# Patient Record
Sex: FEMALE | Race: WHITE | NOT HISPANIC OR LATINO | Employment: OTHER | URBAN - METROPOLITAN AREA
[De-identification: names, ages, dates, MRNs, and addresses within clinical notes are randomized per-mention and may not be internally consistent; named-entity substitution may affect disease eponyms.]

---

## 2017-05-05 ENCOUNTER — AMBULATORY - HEALTHEAST (OUTPATIENT)
Dept: LAB | Facility: CLINIC | Age: 37
End: 2017-05-05

## 2017-05-05 DIAGNOSIS — E03.9 HYPOTHYROID: ICD-10-CM

## 2018-07-24 ENCOUNTER — TRANSFERRED RECORDS (OUTPATIENT)
Dept: HEALTH INFORMATION MANAGEMENT | Facility: CLINIC | Age: 38
End: 2018-07-24

## 2021-02-05 ENCOUNTER — RECORDS - HEALTHEAST (OUTPATIENT)
Dept: ADMINISTRATIVE | Facility: OTHER | Age: 41
End: 2021-02-05

## 2021-03-03 ENCOUNTER — TRANSFERRED RECORDS (OUTPATIENT)
Dept: HEALTH INFORMATION MANAGEMENT | Facility: CLINIC | Age: 41
End: 2021-03-03

## 2021-03-19 ENCOUNTER — HOSPITAL ENCOUNTER (OUTPATIENT)
Dept: RADIOLOGY | Facility: CLINIC | Age: 41
Discharge: HOME | End: 2021-03-19
Admitting: RADIOLOGY

## 2021-03-19 DIAGNOSIS — N80.9 ENDOMETRIOSIS: ICD-10-CM

## 2021-03-19 DIAGNOSIS — N92.6 ABNORMAL MENSTRUAL CYCLE: ICD-10-CM

## 2021-04-28 ENCOUNTER — RECORDS - HEALTHEAST (OUTPATIENT)
Dept: ADMINISTRATIVE | Facility: OTHER | Age: 41
End: 2021-04-28

## 2021-04-28 ENCOUNTER — AMBULATORY - HEALTHEAST (OUTPATIENT)
Dept: SURGERY | Facility: HOSPITAL | Age: 41
End: 2021-04-28

## 2021-04-28 DIAGNOSIS — Z11.59 ENCOUNTER FOR SCREENING FOR OTHER VIRAL DISEASES: ICD-10-CM

## 2021-06-26 DIAGNOSIS — Z11.59 ENCOUNTER FOR SCREENING FOR OTHER VIRAL DISEASES: ICD-10-CM

## 2021-07-12 DIAGNOSIS — Z11.59 ENCOUNTER FOR SCREENING FOR OTHER VIRAL DISEASES: ICD-10-CM

## 2021-07-12 PROCEDURE — U0005 INFEC AGEN DETEC AMPLI PROBE: HCPCS

## 2021-07-12 PROCEDURE — U0003 INFECTIOUS AGENT DETECTION BY NUCLEIC ACID (DNA OR RNA); SEVERE ACUTE RESPIRATORY SYNDROME CORONAVIRUS 2 (SARS-COV-2) (CORONAVIRUS DISEASE [COVID-19]), AMPLIFIED PROBE TECHNIQUE, MAKING USE OF HIGH THROUGHPUT TECHNOLOGIES AS DESCRIBED BY CMS-2020-01-R: HCPCS

## 2021-07-13 LAB — SARS-COV-2 RNA RESP QL NAA+PROBE: NEGATIVE

## 2021-07-15 ENCOUNTER — ANESTHESIA EVENT (OUTPATIENT)
Dept: SURGERY | Facility: HOSPITAL | Age: 41
End: 2021-07-15
Payer: COMMERCIAL

## 2021-07-16 ENCOUNTER — ANESTHESIA (OUTPATIENT)
Dept: SURGERY | Facility: HOSPITAL | Age: 41
End: 2021-07-16
Payer: COMMERCIAL

## 2021-07-16 ENCOUNTER — HOSPITAL ENCOUNTER (OUTPATIENT)
Facility: HOSPITAL | Age: 41
Discharge: HOME | End: 2021-07-16
Attending: OBSTETRICS & GYNECOLOGY | Admitting: OBSTETRICS & GYNECOLOGY
Payer: COMMERCIAL

## 2021-07-16 VITALS
WEIGHT: 138.7 LBS | RESPIRATION RATE: 15 BRPM | HEART RATE: 55 BPM | OXYGEN SATURATION: 99 % | TEMPERATURE: 97.1 F | DIASTOLIC BLOOD PRESSURE: 63 MMHG | SYSTOLIC BLOOD PRESSURE: 105 MMHG

## 2021-07-16 DIAGNOSIS — G89.18 POSTOPERATIVE PAIN: Primary | ICD-10-CM

## 2021-07-16 LAB
BASOPHILS # BLD AUTO: 0 10E3/UL (ref 0–0.2)
BASOPHILS NFR BLD AUTO: 0 %
EOSINOPHIL # BLD AUTO: 0.1 10E3/UL (ref 0–0.7)
EOSINOPHIL NFR BLD AUTO: 1 %
ERYTHROCYTE [DISTWIDTH] IN BLOOD BY AUTOMATED COUNT: 11.6 % (ref 10–15)
HCG UR QL: NEGATIVE
HCT VFR BLD AUTO: 37.8 % (ref 35–47)
HGB BLD-MCNC: 12.5 G/DL (ref 11.7–15.7)
IMM GRANULOCYTES # BLD: 0 10E3/UL
IMM GRANULOCYTES NFR BLD: 0 %
LYMPHOCYTES # BLD AUTO: 1 10E3/UL (ref 0.8–5.3)
LYMPHOCYTES NFR BLD AUTO: 20 %
MCH RBC QN AUTO: 28.7 PG (ref 26.5–33)
MCHC RBC AUTO-ENTMCNC: 33.1 G/DL (ref 31.5–36.5)
MCV RBC AUTO: 87 FL (ref 78–100)
MONOCYTES # BLD AUTO: 0.7 10E3/UL (ref 0–1.3)
MONOCYTES NFR BLD AUTO: 15 %
NEUTROPHILS # BLD AUTO: 3.1 10E3/UL (ref 1.6–8.3)
NEUTROPHILS NFR BLD AUTO: 64 %
NRBC # BLD AUTO: 0 10E3/UL
NRBC BLD AUTO-RTO: 0 /100
PLATELET # BLD AUTO: 218 10E3/UL (ref 150–450)
RBC # BLD AUTO: 4.35 10E6/UL (ref 3.8–5.2)
WBC # BLD AUTO: 4.8 10E3/UL (ref 4–11)

## 2021-07-16 PROCEDURE — 36415 COLL VENOUS BLD VENIPUNCTURE: CPT | Performed by: OBSTETRICS & GYNECOLOGY

## 2021-07-16 PROCEDURE — 81025 URINE PREGNANCY TEST: CPT | Performed by: OBSTETRICS & GYNECOLOGY

## 2021-07-16 PROCEDURE — C1765 ADHESION BARRIER: HCPCS | Performed by: OBSTETRICS & GYNECOLOGY

## 2021-07-16 PROCEDURE — 250N000011 HC RX IP 250 OP 636: Performed by: ANESTHESIOLOGY

## 2021-07-16 PROCEDURE — 272N000001 HC OR GENERAL SUPPLY STERILE: Performed by: OBSTETRICS & GYNECOLOGY

## 2021-07-16 PROCEDURE — 85004 AUTOMATED DIFF WBC COUNT: CPT | Performed by: OBSTETRICS & GYNECOLOGY

## 2021-07-16 PROCEDURE — 710N000009 HC RECOVERY PHASE 1, LEVEL 1, PER MIN: Performed by: OBSTETRICS & GYNECOLOGY

## 2021-07-16 PROCEDURE — 250N000009 HC RX 250: Performed by: NURSE ANESTHETIST, CERTIFIED REGISTERED

## 2021-07-16 PROCEDURE — 250N000011 HC RX IP 250 OP 636: Performed by: OBSTETRICS & GYNECOLOGY

## 2021-07-16 PROCEDURE — 370N000017 HC ANESTHESIA TECHNICAL FEE, PER MIN: Performed by: OBSTETRICS & GYNECOLOGY

## 2021-07-16 PROCEDURE — 710N000012 HC RECOVERY PHASE 2, PER MINUTE: Performed by: OBSTETRICS & GYNECOLOGY

## 2021-07-16 PROCEDURE — 360N000076 HC SURGERY LEVEL 3, PER MIN: Performed by: OBSTETRICS & GYNECOLOGY

## 2021-07-16 PROCEDURE — 258N000003 HC RX IP 258 OP 636: Performed by: ANESTHESIOLOGY

## 2021-07-16 PROCEDURE — 999N000141 HC STATISTIC PRE-PROCEDURE NURSING ASSESSMENT: Performed by: OBSTETRICS & GYNECOLOGY

## 2021-07-16 PROCEDURE — 258N000001 HC RX 258: Performed by: OBSTETRICS & GYNECOLOGY

## 2021-07-16 PROCEDURE — 250N000011 HC RX IP 250 OP 636: Performed by: NURSE ANESTHETIST, CERTIFIED REGISTERED

## 2021-07-16 PROCEDURE — 250N000013 HC RX MED GY IP 250 OP 250 PS 637: Performed by: OBSTETRICS & GYNECOLOGY

## 2021-07-16 RX ORDER — NALOXONE HYDROCHLORIDE 0.4 MG/ML
0.2 INJECTION, SOLUTION INTRAMUSCULAR; INTRAVENOUS; SUBCUTANEOUS
Status: DISCONTINUED | OUTPATIENT
Start: 2021-07-16 | End: 2021-07-16 | Stop reason: HOSPADM

## 2021-07-16 RX ORDER — SODIUM CHLORIDE, SODIUM LACTATE, POTASSIUM CHLORIDE, CALCIUM CHLORIDE 600; 310; 30; 20 MG/100ML; MG/100ML; MG/100ML; MG/100ML
INJECTION, SOLUTION INTRAVENOUS CONTINUOUS
Status: DISCONTINUED | OUTPATIENT
Start: 2021-07-16 | End: 2021-07-16 | Stop reason: HOSPADM

## 2021-07-16 RX ORDER — FENTANYL CITRATE 50 UG/ML
50 INJECTION, SOLUTION INTRAMUSCULAR; INTRAVENOUS EVERY 5 MIN PRN
Status: DISCONTINUED | OUTPATIENT
Start: 2021-07-16 | End: 2021-07-16 | Stop reason: HOSPADM

## 2021-07-16 RX ORDER — NALOXONE HYDROCHLORIDE 0.4 MG/ML
0.4 INJECTION, SOLUTION INTRAMUSCULAR; INTRAVENOUS; SUBCUTANEOUS
Status: DISCONTINUED | OUTPATIENT
Start: 2021-07-16 | End: 2021-07-16 | Stop reason: HOSPADM

## 2021-07-16 RX ORDER — ONDANSETRON 4 MG/1
4 TABLET, ORALLY DISINTEGRATING ORAL EVERY 30 MIN PRN
Status: DISCONTINUED | OUTPATIENT
Start: 2021-07-16 | End: 2021-07-16 | Stop reason: HOSPADM

## 2021-07-16 RX ORDER — SODIUM CHLORIDE, SODIUM LACTATE, POTASSIUM CHLORIDE, AND CALCIUM CHLORIDE .6; .31; .03; .02 G/100ML; G/100ML; G/100ML; G/100ML
IRRIGANT IRRIGATION PRN
Status: DISCONTINUED | OUTPATIENT
Start: 2021-07-16 | End: 2021-07-16 | Stop reason: HOSPADM

## 2021-07-16 RX ORDER — LIDOCAINE 40 MG/G
CREAM TOPICAL
Status: DISCONTINUED | OUTPATIENT
Start: 2021-07-16 | End: 2021-07-16 | Stop reason: HOSPADM

## 2021-07-16 RX ORDER — ACETAMINOPHEN 325 MG/1
325-650 TABLET ORAL EVERY 4 HOURS PRN
Status: DISCONTINUED | OUTPATIENT
Start: 2021-07-16 | End: 2021-07-16 | Stop reason: HOSPADM

## 2021-07-16 RX ORDER — MEPERIDINE HYDROCHLORIDE 25 MG/ML
12.5 INJECTION INTRAMUSCULAR; INTRAVENOUS; SUBCUTANEOUS
Status: DISCONTINUED | OUTPATIENT
Start: 2021-07-16 | End: 2021-07-16 | Stop reason: HOSPADM

## 2021-07-16 RX ORDER — KETOROLAC TROMETHAMINE 30 MG/ML
INJECTION, SOLUTION INTRAMUSCULAR; INTRAVENOUS PRN
Status: DISCONTINUED | OUTPATIENT
Start: 2021-07-16 | End: 2021-07-16

## 2021-07-16 RX ORDER — GLYCOPYRROLATE 0.2 MG/ML
INJECTION, SOLUTION INTRAMUSCULAR; INTRAVENOUS PRN
Status: DISCONTINUED | OUTPATIENT
Start: 2021-07-16 | End: 2021-07-16

## 2021-07-16 RX ORDER — OXYCODONE HYDROCHLORIDE 5 MG/1
TABLET ORAL
Qty: 12 TABLET | Refills: 0 | Status: SHIPPED | OUTPATIENT
Start: 2021-07-16 | End: 2023-09-18

## 2021-07-16 RX ORDER — HYDROMORPHONE HCL IN WATER/PF 6 MG/30 ML
0.4 PATIENT CONTROLLED ANALGESIA SYRINGE INTRAVENOUS EVERY 5 MIN PRN
Status: DISCONTINUED | OUTPATIENT
Start: 2021-07-16 | End: 2021-07-16 | Stop reason: HOSPADM

## 2021-07-16 RX ORDER — LIDOCAINE HYDROCHLORIDE 20 MG/ML
INJECTION, SOLUTION INFILTRATION; PERINEURAL PRN
Status: DISCONTINUED | OUTPATIENT
Start: 2021-07-16 | End: 2021-07-16

## 2021-07-16 RX ORDER — ACETAMINOPHEN 325 MG/1
975 TABLET ORAL ONCE
Status: COMPLETED | OUTPATIENT
Start: 2021-07-16 | End: 2021-07-16

## 2021-07-16 RX ORDER — ONDANSETRON 2 MG/ML
INJECTION INTRAMUSCULAR; INTRAVENOUS PRN
Status: DISCONTINUED | OUTPATIENT
Start: 2021-07-16 | End: 2021-07-16

## 2021-07-16 RX ORDER — ONDANSETRON 2 MG/ML
4 INJECTION INTRAMUSCULAR; INTRAVENOUS EVERY 30 MIN PRN
Status: DISCONTINUED | OUTPATIENT
Start: 2021-07-16 | End: 2021-07-16 | Stop reason: HOSPADM

## 2021-07-16 RX ORDER — PROPOFOL 10 MG/ML
INJECTION, EMULSION INTRAVENOUS PRN
Status: DISCONTINUED | OUTPATIENT
Start: 2021-07-16 | End: 2021-07-16

## 2021-07-16 RX ORDER — OXYCODONE HYDROCHLORIDE 5 MG/1
5-10 TABLET ORAL EVERY 4 HOURS PRN
Status: DISCONTINUED | OUTPATIENT
Start: 2021-07-16 | End: 2021-07-16 | Stop reason: HOSPADM

## 2021-07-16 RX ORDER — IBUPROFEN 200 MG
600 TABLET ORAL EVERY 4 HOURS PRN
Status: DISCONTINUED | OUTPATIENT
Start: 2021-07-16 | End: 2021-07-16 | Stop reason: HOSPADM

## 2021-07-16 RX ORDER — FENTANYL CITRATE 50 UG/ML
INJECTION, SOLUTION INTRAMUSCULAR; INTRAVENOUS PRN
Status: DISCONTINUED | OUTPATIENT
Start: 2021-07-16 | End: 2021-07-16

## 2021-07-16 RX ORDER — NEOSTIGMINE METHYLSULFATE 1 MG/ML
VIAL (ML) INJECTION PRN
Status: DISCONTINUED | OUTPATIENT
Start: 2021-07-16 | End: 2021-07-16

## 2021-07-16 RX ORDER — DEXAMETHASONE SODIUM PHOSPHATE 10 MG/ML
INJECTION, SOLUTION INTRAMUSCULAR; INTRAVENOUS PRN
Status: DISCONTINUED | OUTPATIENT
Start: 2021-07-16 | End: 2021-07-16

## 2021-07-16 RX ADMIN — ROCURONIUM BROMIDE 15 MG: 10 INJECTION, SOLUTION INTRAVENOUS at 13:47

## 2021-07-16 RX ADMIN — NEOSTIGMINE METHYLSULFATE 3 MG: 1 INJECTION INTRAVENOUS at 14:33

## 2021-07-16 RX ADMIN — Medication 25 MG: at 13:50

## 2021-07-16 RX ADMIN — LIDOCAINE HYDROCHLORIDE 60 MG: 20 INJECTION, SOLUTION INFILTRATION; PERINEURAL at 13:20

## 2021-07-16 RX ADMIN — SODIUM CHLORIDE, POTASSIUM CHLORIDE, SODIUM LACTATE AND CALCIUM CHLORIDE: 600; 310; 30; 20 INJECTION, SOLUTION INTRAVENOUS at 12:19

## 2021-07-16 RX ADMIN — FENTANYL CITRATE 50 MCG: 50 INJECTION, SOLUTION INTRAMUSCULAR; INTRAVENOUS at 15:16

## 2021-07-16 RX ADMIN — ACETAMINOPHEN 975 MG: 325 TABLET ORAL at 12:18

## 2021-07-16 RX ADMIN — PROPOFOL 200 MCG/KG/MIN: 10 INJECTION, EMULSION INTRAVENOUS at 13:22

## 2021-07-16 RX ADMIN — DEXAMETHASONE SODIUM PHOSPHATE 10 MG: 10 INJECTION, SOLUTION INTRAMUSCULAR; INTRAVENOUS at 13:29

## 2021-07-16 RX ADMIN — PROPOFOL 140 MG: 10 INJECTION, EMULSION INTRAVENOUS at 13:20

## 2021-07-16 RX ADMIN — FENTANYL CITRATE 100 MCG: 50 INJECTION, SOLUTION INTRAMUSCULAR; INTRAVENOUS at 13:20

## 2021-07-16 RX ADMIN — PROPOFOL 60 MG: 10 INJECTION, EMULSION INTRAVENOUS at 13:46

## 2021-07-16 RX ADMIN — PROPOFOL 250 MG: 10 INJECTION, EMULSION INTRAVENOUS at 13:39

## 2021-07-16 RX ADMIN — ROCURONIUM BROMIDE 35 MG: 10 INJECTION, SOLUTION INTRAVENOUS at 13:20

## 2021-07-16 RX ADMIN — KETOROLAC TROMETHAMINE 15 MG: 30 INJECTION, SOLUTION INTRAMUSCULAR; INTRAVENOUS at 14:26

## 2021-07-16 RX ADMIN — OXYCODONE HYDROCHLORIDE 5 MG: 5 TABLET ORAL at 16:29

## 2021-07-16 RX ADMIN — GLYCOPYRROLATE 0.4 MG: 0.2 INJECTION, SOLUTION INTRAMUSCULAR; INTRAVENOUS at 14:33

## 2021-07-16 RX ADMIN — MIDAZOLAM HYDROCHLORIDE 2 MG: 1 INJECTION, SOLUTION INTRAMUSCULAR; INTRAVENOUS at 13:10

## 2021-07-16 RX ADMIN — ONDANSETRON 4 MG: 2 INJECTION INTRAMUSCULAR; INTRAVENOUS at 14:26

## 2021-07-16 NOTE — ANESTHESIA CARE TRANSFER NOTE
Patient: Peace Maki    Procedure(s):  LAPAROSCOPY WITH A TUBAL DYE STUDY,FULGURATION OF  ENDOMETRIOSIS    Diagnosis: Dysmenorrhea [N94.6]  Endometriosis [N80.9]  Abnormal findings on imaging test [R93.89]  Diagnosis Additional Information: No value filed.    Anesthesia Type:   General     Note:      Level of Consciousness: awake and drowsy  Oxygen Supplementation: face mask  Level of Supplemental Oxygen (L/min / FiO2): 8  Independent Airway: airway patency satisfactory and stable  Dentition: dentition unchanged  Vital Signs Stable: post-procedure vital signs reviewed and stable  Report to RN Given: handoff report given  Patient transferred to: PACU    Handoff Report: Identifed the Patient, Identified the Reponsible Provider, Reviewed the pertinent medical history, Discussed the surgical course, Reviewed Intra-OP anesthesia mangement and issues during anesthesia, Set expectations for post-procedure period and Allowed opportunity for questions and acknowledgement of understanding      Vitals: (Last set prior to Anesthesia Care Transfer)  CRNA VITALS  7/16/2021 1421 - 7/16/2021 1457      7/16/2021             /66  HR 75  Sat 100%  RR 12         Electronically Signed By: RAMANDEEP Marroquin CRNA  July 16, 2021  2:57 PM

## 2021-07-16 NOTE — H&P
21 Peace Maki [facility] [birth year]     Chief Complaint:  Painful periods with history of endometriosis and an abnormal hysterosalpingogram.     History of the Present Illness:  Patient is a 40-year-old  4 para 2 spontaneous loss 2 female who was diagnosed with endometriosis 8 years ago.  Since that time, she had vaginal births 4 years ago and 6 years ago.  She does have pain with her menses but no pain with ovulation or pelvic pain.  She underwent an HSG which did not show any spill from either tube.  She is concerned that she may have scarring.  She would like to conceive.  She uses Advil, Tylenol and a heating pad for the pain with her menses and it does not really help much.     Past Medical History:  She denies medical problems including diabetes, high blood pressure, heart disease, rheumatoid arthritis, kidney disease, psychiatric disease, seizures, hepatitis, asthma or abnormal Pap smear.  She does take desiccated thyroid 30 mg daily.  She has no drug allergies.  She underwent laparoscopy 8 years ago with findings of endometriosis.     Personal, Family and Social History:  She has been  for 15 years and has a college degree.  She is an .  She does not smoke.  Her  also designs houses.  Her parents are in good health and she has 3 brothers and 3 sisters who are in good health.  She is from [place] and her  is from [place].     Review of Systems:  Noncontributory.     Physical Exam:  Blood pressure 115/74, temp 97.5, pulse 82, height 65 inches, weight 125 pounds, BMI 27.5.  General appearance: Well-developed, well-nourished white female who is in no acute distress and who is oriented with a normal gait.  Thyroid: Normal.  Lungs: Clear.  Heart: Normal sinus rhythm without significant murmurs.  Breasts: Not examined.  Abdomen: Soft.  No masses, organomegaly or tenderness present.  Skin: Clear.  Extremities: No cyanosis, clubbing or edema.  Pelvic exam: Deferred  today.     Assessment:  1.  Dysmenorrhea, history of pelvic endometriosis documented on laparoscopy and abnormal HSG.  Concerned about tubal scarring.     Plan:  She will be brought to hospital for laparoscopy with a bilateral tubal dye study with possible hysteroscopy.  I explained that if I could not get spill from each tube, I would treat any endometriosis or abnormalities I found and the pelvis and perform an HSG to see if I could cannulate the tubes to open them up.  I discussed the risks of the surgery including infection, bleeding, anesthesia and injury to other organs and the small chance of a bigger surgery of severe endometriosis were found.  I explained that I could make no guarantees regarding relief of her pain or regarding opening her tubes.  She was agreeable with that approach, voiced understanding and signed an appropriate consent form.  Hemoglobin today was 13.2.     Vasu Khan MD

## 2021-07-16 NOTE — ANESTHESIA PREPROCEDURE EVALUATION
Anesthesia Pre-Procedure Evaluation    Patient: Peace Maki   MRN: [id number] : 1980        Preoperative Diagnosis: Dysmenorrhea [N94.6]  Endometriosis [N80.9]  Abnormal findings on imaging test [R93.89]   Procedure : Procedure(s):  LAPAROSCOPY WITH A TUBAL DYE STUDY;  POSSIBLE HYSTEROSCOPY     Past Medical History:   Diagnosis Date     PONV (postoperative nausea and vomiting)       Past Surgical History:   Procedure Laterality Date     NO PAST SURGERIES        No Known Allergies   Social History     Tobacco Use     Smoking status: Never Smoker     Smokeless tobacco: Never Used   Substance Use Topics     Alcohol use: Not on file      Wt Readings from Last 1 Encounters:   21 62.9 kg (138 lb 11.2 oz)        Anesthesia Evaluation   Pt has had prior anesthetic. Type: General.    No history of anesthetic complications       ROS/MED HX  ENT/Pulmonary:  - neg pulmonary ROS     Neurologic:       Cardiovascular:  - neg cardiovascular ROS     METS/Exercise Tolerance:     Hematologic:  - neg hematologic  ROS     Musculoskeletal:  - neg musculoskeletal ROS     GI/Hepatic:  - neg GI/hepatic ROS     Renal/Genitourinary:  - neg Renal ROS     Endo:  - neg endo ROS     Psychiatric/Substance Use:  - neg psychiatric ROS     Infectious Disease:  - neg infectious disease ROS     Malignancy:  - neg malignancy ROS     Other:            Physical Exam    Airway        Mallampati: I   TM distance: > 3 FB   Neck ROM: full   Mouth opening: > 3 cm    Respiratory Devices and Support         Dental  no notable dental history         Cardiovascular   cardiovascular exam normal          Pulmonary   pulmonary exam normal                OUTSIDE LABS:  CBC:   Lab Results   Component Value Date    WBC 4.8 2021    HGB 12.5 2021    HCT 37.8 2021     2021     BMP: No results found for: NA, POTASSIUM, CHLORIDE, CO2, BUN, CR, GLC  COAGS: No results found for: PTT, INR, FIBR  POC:   Lab Results   Component  Value Date    HCG Negative 07/16/2021     HEPATIC: No results found for: ALBUMIN, PROTTOTAL, ALT, AST, GGT, ALKPHOS, BILITOTAL, BILIDIRECT, KRISTEN  OTHER: No results found for: PH, LACT, A1C, FARZANA, PHOS, MAG, LIPASE, AMYLASE, TSH, T4, T3, CRP, SED    Anesthesia Plan    ASA Status:  1      Anesthesia Type: General.     - Airway: ETT   Induction: Intravenous, Propofol.   Maintenance: Inhalation.        Consents         - Patient is DNR/DNI Status: No         Postoperative Care       PONV prophylaxis: Ondansetron (or other 5HT-3), Dexamethasone or Solumedrol     Comments:    Pt told me she had no issues with GEN.  Mg++ infusion, ketamine, ketorolac if ok with surgeon.              Bertram Paiz MD

## 2021-07-16 NOTE — PLAN OF CARE
Discharge Instructions after Laparoscopy    You have three small abdominal incisions from your laparoscopy. Because of the incisions and the surgery done on the inside, you will be achy, sore and tired. Since the incisions are small, your activity level is based on your comfort.    You will be discharged with a prescription for Oxycodone for pain. You may combine the Oxycodone with Ibuprofen and/or Tylenol to increase its effectiveness. Please take the  Oxycodone if needed. If you do not need it, do not take it.    Please watch for infection by taking your temperature every evening the first week at home and call me if it goes above 100 degrees. If your incisions become red, swollen, painful or drain pus, please call.    You may removed the dressings from your wounds in three days. The stitches beneath the skin will eventually dissolve and the part above the skin will fall out.Bathing or showering is fine.    Please call the office at [phone] for a post operative appointment in two weeks.     For after-hours emergencies, you can reach me at [phone].        Sergio Khan MD

## 2021-07-16 NOTE — OP NOTE
21 Peace Maki  [birth year]    Operative note    Preoperative diagnosis: #1.  Dysmenorrhea, history of pelvic endometriosis documented on laparoscopy and abnormal HSG.  Concern about tubal scarring.    Postoperative diagnosis: #1.  Same.  2.  Stage II pelvic endometriosis.  3.  Patent fallopian tubes.    Procedure: Laparoscopy with a tubal dye study and fulguration of pelvic endometriosis.    Surgeon: Bill.    Anesthetic agent: General.    Specimens: None.    Findings: See the operative note.    Description of the operation: The patient was placed in the boot stirrup position with the leg slightly above horizontal after the induction of adequate general anesthesia.  The abdomen and perineum were prepped and draped for laparoscopy with a tubal dye study and possible hysteroscopy in the routine manner.  A Conn cannula was attached to the cervix and a Shane catheter draining urinary bladder.  An infraumbilical incision was made through the skin and varies needle inserted intraperitoneally.  The abdomen was insufflated to pressure 15 and a 5 mm disposable trocar inserted with 1 thrust without difficulty.  Insertion was confirmed by visualization through the laparoscope on the video screen.  Ultimately, a 5 mm port was placed suprapubically and a third 5 mm port was placed in the left lower quadrant.  The patient was placed in Trendelenburg and the pelvis visualized.  The uterus appeared normal.  There were some endometriosis implants on the posterior aspect of the uterus near the cervix.  The right tube appeared normal as did the right ovary.  The left tube appeared normal as did the left ovary.  Dye was injected through the con cannula with spill from the left side but not the right.  I then went below and repositioned the con cannula and again injected and there was spill from both sides.  I then went below and replaced the con cannula with the HUMI uterine manipulator.  The pelvis was then irrigated.   There were endometriosis implants in the cul-de-sac, the uterosacral ligaments where they join the uterus, the posterior aspect of the right broad ligament, the right pelvic sidewall, left pelvic sidewall, and the posterior aspect of the left broad ligament.  A few of them were deeply infiltrative making the diagnosis of stage II endometriosis.  There was a defect in the peritoneum on the left side of the broad ligament about a centimeter in size with endometriosis near it.  The appendix appeared normal liver appeared normal in color and smooth.  All the endometriosis implants were fulgurated with bipolar cautery thoroughly.  Interceed adhesion barriers were then placed around each ovary, the uterus and the cul-de-sac.  The gas was allowed to escape from the abdomen and instruments removed.  The incisions were closed with 4-0 plain sutures and 2 x 2's and Tegaderm applied.  Estimated blood loss was 15 cc.  The patient tolerated the operation well and was returned to the recovery room in good condition.  Sponge and needle counts were correct.  The Shane catheter and HUMI uterine manipulator were removed at the end of the procedure.    Vasu Khan MD

## 2021-07-16 NOTE — H&P
21 Peace Maki [facility] [birth year]    Chief Complaint:  Painful periods with history of endometriosis and an abnormal hysterosalpingogram.    History of the Present Illness:  Patient is a 40-year-old  4 para 2 spontaneous loss 2 female who was diagnosed with endometriosis 8 years ago.  Since that time, she had vaginal births 4 years ago and 6 years ago.  She does have pain with her menses but no pain with ovulation or pelvic pain.  She underwent an HSG which did not show any spill from either tube.  She is concerned that she may have scarring.  She would like to conceive.  She uses Advil, Tylenol and a heating pad for the pain with her menses and it does not really help much.    Past Medical History:  She denies medical problems including diabetes, high blood pressure, heart disease, rheumatoid arthritis, kidney disease, psychiatric disease, seizures, hepatitis, asthma or abnormal Pap smear.  She does take desiccated thyroid 30 mg daily.  She has no drug allergies.  She underwent laparoscopy 8 years ago with findings of endometriosis.    Personal, Family and Social History:  She has been  for 15 years and has a college degree.  She is an .  She does not smoke.  Her  also designs houses.  Her parents are in good health and she has 3 brothers and 3 sisters who are in good health.  She is from [place] and her  is from [place].    Review of Systems:  Noncontributory.    Physical Exam:  Blood pressure 115/74, temp 97.5, pulse 82, height 65 inches, weight 125 pounds, BMI 27.5.  General appearance: Well-developed, well-nourished white female who is in no acute distress and who is oriented with a normal gait.  Thyroid: Normal.  Lungs: Clear.  Heart: Normal sinus rhythm without significant murmurs.  Breasts: Not examined.  Abdomen: Soft.  No masses, organomegaly or tenderness present.  Skin: Clear.  Extremities: No cyanosis, clubbing or edema.  Pelvic exam: Deferred  today.    Assessment:  1.  Dysmenorrhea, history of pelvic endometriosis documented on laparoscopy and abnormal HSG.  Concerned about tubal scarring.    Plan:  She will be brought to hospital for laparoscopy with a bilateral tubal dye study with possible hysteroscopy.  I explained that if I could not get spill from each tube, I would treat any endometriosis or abnormalities I found and the pelvis and perform an HSG to see if I could cannulate the tubes to open them up.  I discussed the risks of the surgery including infection, bleeding, anesthesia and injury to other organs and the small chance of a bigger surgery of severe endometriosis were found.  I explained that I could make no guarantees regarding relief of her pain or regarding opening her tubes.  She was agreeable with that approach, voiced understanding and signed an appropriate consent form.  Hemoglobin today was 13.2.    Vasu Khan MD

## 2021-07-17 NOTE — ANESTHESIA POSTPROCEDURE EVALUATION
Patient: Peace Maki    Procedure(s):  LAPAROSCOPY WITH A TUBAL DYE STUDY,FULGURATION OF  ENDOMETRIOSIS    Diagnosis:Dysmenorrhea [N94.6]  Endometriosis [N80.9]  Abnormal findings on imaging test [R93.89]  Diagnosis Additional Information: No value filed.    Anesthesia Type:  General    Note:  Disposition: Outpatient   Postop Pain Control: Uneventful            Sign Out: Well controlled pain   PONV: No   Neuro/Psych: Uneventful            Sign Out: Acceptable/Baseline neuro status   Airway/Respiratory: Uneventful            Sign Out: Acceptable/Baseline resp. status   CV/Hemodynamics: Uneventful            Sign Out: Acceptable CV status; No obvious hypovolemia; No obvious fluid overload   Other NRE: NONE   DID A NON-ROUTINE EVENT OCCUR? No    Event details/Postop Comments:  No issues.  Teeth atraumatic.  Pain controlled.  AVSS.  No awareness.  Eyes without trauma.            Last vitals:  Vitals:    07/16/21 1515 07/16/21 1530 07/16/21 1545   BP: 110/63 98/61 105/63   Pulse: 59 51 55   Resp: 20 16 15   Temp:      SpO2: 100% 99% 99%       Last vitals prior to Anesthesia Care Transfer:  CRNA VITALS  7/16/2021 1427 - 7/16/2021 1527      7/16/2021             Resp Rate (observed):  (!) 2          Electronically Signed By: Bony Cooper MD  July 16, 2021  7:49 PM

## 2022-03-27 ENCOUNTER — HEALTH MAINTENANCE LETTER (OUTPATIENT)
Age: 42
End: 2022-03-27

## 2022-07-15 ENCOUNTER — TRANSFERRED RECORDS (OUTPATIENT)
Dept: HEALTH INFORMATION MANAGEMENT | Facility: CLINIC | Age: 42
End: 2022-07-15

## 2022-07-15 LAB
ALT SERPL-CCNC: 12 U/L (ref 6–29)
CREATININE (EXTERNAL): 0.84 MG/DL (ref 0.5–0.99)
GFR ESTIMATED (EXTERNAL): 89 ML/MIN/1.73M2
GLUCOSE (EXTERNAL): 87 MG/DL (ref 65–99)
POTASSIUM (EXTERNAL): 3.7 MMOL/L (ref 3.5–5.3)
TSH SERPL-ACNC: 1.28 MIU/L (ref 0.4–4.5)

## 2022-07-25 ENCOUNTER — TRANSFERRED RECORDS (OUTPATIENT)
Dept: HEALTH INFORMATION MANAGEMENT | Facility: CLINIC | Age: 42
End: 2022-07-25

## 2022-09-25 ENCOUNTER — HEALTH MAINTENANCE LETTER (OUTPATIENT)
Age: 42
End: 2022-09-25

## 2023-05-13 ENCOUNTER — HEALTH MAINTENANCE LETTER (OUTPATIENT)
Age: 43
End: 2023-05-13

## 2023-09-12 ENCOUNTER — TRANSFERRED RECORDS (OUTPATIENT)
Dept: HEALTH INFORMATION MANAGEMENT | Facility: CLINIC | Age: 43
End: 2023-09-12
Payer: COMMERCIAL

## 2023-09-17 ASSESSMENT — ENCOUNTER SYMPTOMS
COUGH: 0
WEAKNESS: 0
HEMATOCHEZIA: 0
HEADACHES: 0
ARTHRALGIAS: 0
HEMATURIA: 0
DIZZINESS: 0
JOINT SWELLING: 0
PALPITATIONS: 0
ABDOMINAL PAIN: 0
BREAST MASS: 0
SORE THROAT: 0
FREQUENCY: 0
DYSURIA: 0
MYALGIAS: 1
FEVER: 0
SHORTNESS OF BREATH: 0
NAUSEA: 0
CHILLS: 0
EYE PAIN: 0
HEARTBURN: 0
PARESTHESIAS: 0
CONSTIPATION: 0
NERVOUS/ANXIOUS: 0
DIARRHEA: 0

## 2023-09-18 ENCOUNTER — OFFICE VISIT (OUTPATIENT)
Dept: FAMILY MEDICINE | Facility: CLINIC | Age: 43
End: 2023-09-18
Payer: COMMERCIAL

## 2023-09-18 VITALS
SYSTOLIC BLOOD PRESSURE: 134 MMHG | HEIGHT: 65 IN | DIASTOLIC BLOOD PRESSURE: 83 MMHG | HEART RATE: 81 BPM | TEMPERATURE: 97.8 F | OXYGEN SATURATION: 99 % | WEIGHT: 144 LBS | BODY MASS INDEX: 23.99 KG/M2 | RESPIRATION RATE: 12 BRPM

## 2023-09-18 DIAGNOSIS — Z13.29 SCREENING FOR THYROID DISORDER: ICD-10-CM

## 2023-09-18 DIAGNOSIS — Z00.00 VISIT FOR PREVENTIVE HEALTH EXAMINATION: Primary | ICD-10-CM

## 2023-09-18 LAB
CHOLEST SERPL-MCNC: 201 MG/DL
FASTING STATUS PATIENT QL REPORTED: YES
GLUCOSE SERPL-MCNC: 102 MG/DL (ref 70–99)
HDLC SERPL-MCNC: 89 MG/DL
LDLC SERPL CALC-MCNC: 101 MG/DL
NONHDLC SERPL-MCNC: 112 MG/DL
TRIGL SERPL-MCNC: 54 MG/DL
TSH SERPL DL<=0.005 MIU/L-ACNC: 1.44 UIU/ML (ref 0.3–4.2)

## 2023-09-18 PROCEDURE — 84443 ASSAY THYROID STIM HORMONE: CPT | Performed by: NURSE PRACTITIONER

## 2023-09-18 PROCEDURE — 82947 ASSAY GLUCOSE BLOOD QUANT: CPT | Performed by: NURSE PRACTITIONER

## 2023-09-18 PROCEDURE — 36415 COLL VENOUS BLD VENIPUNCTURE: CPT | Performed by: NURSE PRACTITIONER

## 2023-09-18 PROCEDURE — 80061 LIPID PANEL: CPT | Performed by: NURSE PRACTITIONER

## 2023-09-18 PROCEDURE — 99386 PREV VISIT NEW AGE 40-64: CPT | Performed by: NURSE PRACTITIONER

## 2023-09-18 RX ORDER — PROGESTERONE 200 MG/1
CAPSULE ORAL
COMMUNITY
End: 2024-03-04

## 2023-09-18 RX ORDER — LETROZOLE 2.5 MG/1
TABLET, FILM COATED ORAL
COMMUNITY
Start: 2022-12-27 | End: 2024-03-04

## 2023-09-18 ASSESSMENT — ANXIETY QUESTIONNAIRES
1. FEELING NERVOUS, ANXIOUS, OR ON EDGE: NOT AT ALL
GAD7 TOTAL SCORE: 0
GAD7 TOTAL SCORE: 0
4. TROUBLE RELAXING: NOT AT ALL
7. FEELING AFRAID AS IF SOMETHING AWFUL MIGHT HAPPEN: NOT AT ALL
2. NOT BEING ABLE TO STOP OR CONTROL WORRYING: NOT AT ALL
5. BEING SO RESTLESS THAT IT IS HARD TO SIT STILL: NOT AT ALL
3. WORRYING TOO MUCH ABOUT DIFFERENT THINGS: NOT AT ALL
6. BECOMING EASILY ANNOYED OR IRRITABLE: NOT AT ALL
IF YOU CHECKED OFF ANY PROBLEMS ON THIS QUESTIONNAIRE, HOW DIFFICULT HAVE THESE PROBLEMS MADE IT FOR YOU TO DO YOUR WORK, TAKE CARE OF THINGS AT HOME, OR GET ALONG WITH OTHER PEOPLE: NOT DIFFICULT AT ALL

## 2023-09-18 ASSESSMENT — ENCOUNTER SYMPTOMS
HEMATURIA: 0
PARESTHESIAS: 0
MYALGIAS: 1
PALPITATIONS: 0
COUGH: 0
BREAST MASS: 0
DIZZINESS: 0
HEADACHES: 0
HEMATOCHEZIA: 0
FREQUENCY: 0
CHILLS: 0
EYE PAIN: 0
ARTHRALGIAS: 0
DIARRHEA: 0
ABDOMINAL PAIN: 0
HEARTBURN: 0
WEAKNESS: 0
SORE THROAT: 0
JOINT SWELLING: 0
FEVER: 0
NAUSEA: 0
SHORTNESS OF BREATH: 0
CONSTIPATION: 0
NERVOUS/ANXIOUS: 0
DYSURIA: 0

## 2023-09-18 NOTE — PROGRESS NOTES
SUBJECTIVE:   CC: Peace is an 42 year old who presents for preventive health visit.   -She feels her health is overall stable.  She stated that she has been seen at an outside OB/GYN provider and she believes her Paps are up-to-date.  She stated that her Pap smears have been negative.  Family history of breast cancer, and her mammogram was completed and it was normal.  She follows up with dermatology every year for precancerous lesions.  She has a family history of skin cancer.   -She is following up with OB/GYN also related to oral progesterone.  She stated that oral progesterone has been really helpful with preventing migraines, and PMS.  She did have her hormones checked with OB/GYN.  She stated that she was also on thyroid medication back in 2015.  Has been off the medication for couple years now.  She was diagnosed with endometriosis back in 2021.         9/18/2023     9:23 AM   Additional Questions   Roomed by Roge       Healthy Habits:     Getting at least 3 servings of Calcium per day:  Yes    Bi-annual eye exam:  Yes    Dental care twice a year:  Yes    Sleep apnea or symptoms of sleep apnea:  None    Diet:  Regular (no restrictions)    Frequency of exercise:  2-3 days/week    Duration of exercise:  15-30 minutes    Taking medications regularly:  Yes    Medication side effects:  None    Additional concerns today:  Yes      Today's PHQ-2 Score:       9/17/2023     9:38 PM   PHQ-2 ( 1999 [employer])   Q1: Little interest or pleasure in doing things 0   Q2: Feeling down, depressed or hopeless 0   PHQ-2 Score 0   Q1: Little interest or pleasure in doing things Not at all   Q2: Feeling down, depressed or hopeless Not at all   PHQ-2 Score 0     Have you ever done Advance Care Planning? (For example, a Health Directive, POLST, or a discussion with a medical provider or your loved ones about your wishes): No, advance care planning information given to patient to review.  Advanced care planning was discussed at  today's visit.    Social History     Tobacco Use    Smoking status: Never    Smokeless tobacco: Never   Substance Use Topics    Alcohol use: Never             9/17/2023     9:38 PM   Alcohol Use   Prescreen: >3 drinks/day or >7 drinks/week? No          No data to display              Reviewed orders with patient.  Reviewed health maintenance and updated orders accordingly - Yes  Lab work is in process  Labs reviewed in EPIC    Breast Cancer Screening:    FHS-7:       9/17/2023     9:40 PM   Breast CA Risk Assessment (FHS-7)   Did any of your first-degree relatives have breast or ovarian cancer? Yes   Did any of your relatives have bilateral breast cancer? No   Did any man in your family have breast cancer? No   Did any woman in your family have breast and ovarian cancer? No   Did any woman in your family have breast cancer before age 50 y? No   Do you have 2 or more relatives with breast and/or ovarian cancer? Yes   Do you have 2 or more relatives with breast and/or bowel cancer? No     click delete button to remove this line now  Mammogram Screening - Offered annual screening and updated Health Maintenance for mutual plan based on risk factor consideration  Pertinent mammograms are reviewed under the imaging tab.    History of abnormal Pap smear: NO - age 30-65 PAP every 5 years with negative HPV co-testing recommended     Reviewed and updated as needed this visit by clinical staff   Tobacco  Allergies  Meds              Reviewed and updated as needed this visit by Provider                 Past Medical History:   Diagnosis Date    PONV (postoperative nausea and vomiting)       Past Surgical History:   Procedure Laterality Date    LAPAROSCOPIC TUBAL DYE STUDY Bilateral 7/16/2021    Procedure: LAPAROSCOPY WITH A TUBAL DYE STUDY, FULGURATION OF  ENDOMETRIOSIS;  Surgeon: Vasu Khan MD;  Location: [facility] [place] OR    NO PAST SURGERIES         Review of Systems   Constitutional:  Negative for chills  "and fever.   HENT:  Positive for ear pain. Negative for congestion, hearing loss and sore throat.    Eyes:  Negative for pain and visual disturbance.   Respiratory:  Negative for cough and shortness of breath.    Cardiovascular:  Negative for chest pain, palpitations and peripheral edema.   Gastrointestinal:  Negative for abdominal pain, constipation, diarrhea, heartburn, hematochezia and nausea.   Breasts:  Negative for tenderness, breast mass and discharge.   Genitourinary:  Positive for pelvic pain. Negative for dysuria, frequency, genital sores, hematuria, urgency, vaginal bleeding and vaginal discharge.   Musculoskeletal:  Positive for myalgias. Negative for arthralgias and joint swelling.   Skin:  Negative for rash.   Neurological:  Negative for dizziness, weakness, headaches and paresthesias.   Psychiatric/Behavioral:  Negative for mood changes. The patient is not nervous/anxious.           OBJECTIVE:   /83   Pulse 81   Temp 97.8  F (36.6  C) (Oral)   Resp 12   Ht 1.651 m (5' 5\")   Wt 65.3 kg (144 lb)   LMP 09/02/2023 (Exact Date)   SpO2 99%   BMI 23.96 kg/m    Physical Exam  GENERAL: healthy, alert and no distress  NECK: no adenopathy, no asymmetry, masses, or scars and thyroid normal to palpation  RESP: lungs clear to auscultation - no rales, rhonchi or wheezes  CV: regular rate and rhythm, normal S1 S2, no S3 or S4, no murmur, click or rub, no peripheral edema and peripheral pulses strong  ABDOMEN: soft, nontender, no hepatosplenomegaly, no masses and bowel sounds normal  MS: no gross musculoskeletal defects noted, no edema  PSYCH: mentation appears normal, affect normal/bright  LYMPH: no cervical, supraclavicular, axillary, or inguinal adenopathy    Diagnostic Test Results:  Labs reviewed in Epic    ASSESSMENT/PLAN:       ICD-10-CM    1. Visit for preventive health examination  Z00.00 Lipid Profile (Chol, Trig, HDL, LDL calc)     Glucose     Lipid Profile (Chol, Trig, HDL, LDL calc)     " Glucose      2. Screening for thyroid disorder  Z13.29 TSH with free T4 reflex     TSH with free T4 reflex        -I reviewed prior medications with patient today.  She is on the fence if she wants to continue the progesterone.  She will continue to monitor symptoms.   -She is currently taking vitamin D supplements, I discussed the max dose is typically 4000 international units/day.   -We will check a thyroid level since she has a prior usage of thyroid medication.  We will treat if needed.   -She can continue to follow-up with Derm.   TIANNA was completed for OB/GYN-    Patient has been advised of split billing requirements and indicates understanding: Yes      COUNSELING:  Reviewed preventive health counseling, as reflected in patient instructions        She reports that she has never smoked. She has never used smokeless tobacco.          RAMANDEEP Lorenzana Mercy HospitalAnswers submitted by the patient for this visit:  CHARLES-7 (Submitted on 9/18/2023)  CHARLES 7 TOTAL SCORE: 0

## 2023-09-19 NOTE — RESULT ENCOUNTER NOTE
Ezra Hand    It was a pleasure meeting you yesterday!    Your thyroid level is within the normal range.     Your fasting glucose is within the prediabetic range.  We deftly want to prevent this value from increasing so please try to work on a low carbohydrate diet and regular exercise.  Blood pressure recheck this fasting within 1 year.     Your total cholesterol is 201.  Your HDL is great at 89. No statin medication is indicated.     If you have any questions, please let me know.     Maite

## 2024-03-04 ENCOUNTER — VIRTUAL VISIT (OUTPATIENT)
Dept: URGENT CARE | Facility: CLINIC | Age: 44
End: 2024-03-04
Payer: COMMERCIAL

## 2024-03-04 DIAGNOSIS — R19.7 DIARRHEA, UNSPECIFIED TYPE: Primary | ICD-10-CM

## 2024-03-04 PROCEDURE — 99213 OFFICE O/P EST LOW 20 MIN: CPT | Mod: 95

## 2024-03-04 NOTE — PROGRESS NOTES
Subjective   HPI    Not breastfeeding, not sure if she could be pregnant : currently trying to be pregnant    2 weeks ago felt nauseous   Had a weird pain in the midback left side went away after couple of days    A few days later one of her 4 kids started getting stomach bug  Then went through everyone over the past 24 hours    4 days ago then started liquid diarrhea x 2 hours   Since then when she eats would just cause diarrhea.  Drinking ok - drinking electrolyte drinks  Has been able to eat now a bit the past 2 days  But last night was worse again   Was diarrhea every 10 minutes in the morning   Fever and chills:No  Abdominal pain: No  Nausea and vomiting: No just nausea and cramping   Bloody Diarrhea: No  Recent antibiotic use: a month ago - patient was given an antibiotic 5 day for sinus infection   Recent travel:No  Known ill contacts or exposure to c.diff:Not directly - however someone she knows was diagnosed but they haven't seen each other for months   Eating any raw or undercooked foods:No  Lightheadedness, syncope, or presyncope:No    No chest pain or shortness of breath or neck stiffness    Problem list and histories reviewed & adjusted, as indicated.  Additional history: as documented    Problem list, Medication list, Allergies, and Medical/Social/Surgical histories reviewed in [facility] and updated as appropriate.    ROS:  Constitutional, HEENT, cardiovascular, pulmonary, gi and gu systems are negative, except as otherwise noted.    OBJECTIVE:                                                    There were no vitals taken for this visit.  There is no height or weight on file to calculate BMI.  Orthostatic: No  GENERAL: healthy, alert and no distress  PSYCH: Alert and oriented times 3; coherent speech, normal   rate and volume, able to articulate logical thoughts, able   to abstract reason, no tangential thoughts, no hallucinations   or delusions  Her affect is normal  RESP: No cough, no audible wheezing,  able to talk in full sentences  Additional exam:  none  Remainder of exam unable to be completed due to telephone visits    Diagnostic Test Results:  No results found for this or any previous visit (from the past 24 hour(s)).     ASSESSMENT/PLAN:                                                    No diagnosis found.      ICD-10-CM    1. Diarrhea, unspecified type  R19.7 Enteric Bacteria and Virus Panel by [facility] Stool     C. difficile Toxin B PCR with reflex to C. difficile Antigen and Toxins A/B EIA        Check for stool studies   Supportive treatment discussed, BRAT diet.  Aware to come in right away or go to ER if feeling weak, unable to keep things down, lightheadedness, syncope, presyncope, persistent symptoms  Recommend follow up with primary care provider if no relief in 3 days, sooner if worse  Adverse reactions of medications discussed.  Over the counter medications discussed.   Aware to come back in if with worsening symptoms or if no relief despite treatment plan  Patient voiced understanding and had no further questions.     Jenny Neves MD  Video done via:  Cash Check Card  Originating site: patient home   Provider location: provider home   Joined the call at 3/4/2024, 10:03:41 am.  Left the call at 3/4/2024, 10:12:36 am.  You were on the call for 8 minutes 55 seconds .  Virtual Urgent Care  [facility] VIRTUAL URGENT CARE

## 2024-06-25 ENCOUNTER — VIRTUAL VISIT (OUTPATIENT)
Dept: FAMILY MEDICINE | Facility: CLINIC | Age: 44
End: 2024-06-25
Payer: COMMERCIAL

## 2024-06-25 DIAGNOSIS — G43.829 MENSTRUAL MIGRAINE WITHOUT STATUS MIGRAINOSUS, NOT INTRACTABLE: ICD-10-CM

## 2024-06-25 DIAGNOSIS — N94.3 PMS (PREMENSTRUAL SYNDROME): Primary | ICD-10-CM

## 2024-06-25 PROCEDURE — G2211 COMPLEX E/M VISIT ADD ON: HCPCS | Mod: 95 | Performed by: NURSE PRACTITIONER

## 2024-06-25 PROCEDURE — 99213 OFFICE O/P EST LOW 20 MIN: CPT | Mod: 95 | Performed by: NURSE PRACTITIONER

## 2024-06-25 RX ORDER — PROGESTERONE 200 MG/1
200 CAPSULE ORAL DAILY
Qty: 30 CAPSULE | Refills: 3 | Status: SHIPPED | OUTPATIENT
Start: 2024-06-25 | End: 2024-06-25

## 2024-06-25 RX ORDER — PROGESTERONE 200 MG/1
200 CAPSULE ORAL AT BEDTIME
Qty: 30 CAPSULE | Refills: 3 | Status: SHIPPED | OUTPATIENT
Start: 2024-06-25

## 2024-06-25 NOTE — PROGRESS NOTES
Peace is a 43 year old who is being evaluated via a billable video visit.    How would you like to obtain your AVS? MyChart  If the video visit is dropped, the invitation should be resent by: Text to cell phone: [phone]  Will anyone else be joining your video visit? No      Assessment & Plan     PMS (premenstrual syndrome)  **  - progesterone (PROMETRIUM) 200 MG capsule  Dispense: 30 capsule; Refill: 3    Menstrual migraine without status migrainosus, not intractable  **  - progesterone (PROMETRIUM) 200 MG capsule  Dispense: 30 capsule; Refill: 3    - she has plans to follow up with GYN for pap and to further assess the medication with them. Would like it refilled. Other options for PMS discussed such as Celexa, or Prozac. Get mammograms yearly.   -The longitudinal plan of care for the diagnosis(es)/condition(s) as documented were addressed during this visit. Due to the added complexity in care, I will continue to support Peace in the subsequent management and with ongoing continuity of care.        Subjective   Peace is a 43 year old, presenting for the following health issues:  Recheck Medication    - has been on Progesterone in the past. Stopped the med, but then noticed her migraines prior or after period returned and she has PMS like symptoms. She was off the med for months. She would like to restart the medication again. She is having regular periods. Up to date on Mammograms, does have a family HO breast cancer. She typically just takes the progesterone prior to period, and then stops (maybe two weeks on, two weeks off).        9/18/2023     9:23 AM   Additional Questions   Roomed by Roge MORRISON     Review of Systems  Constitutional, HEENT, cardiovascular, pulmonary, gi and gu systems are negative, except as otherwise noted.      Objective         Vitals:  No vitals were obtained today due to virtual visit.    Physical Exam   GENERAL: alert and no distress  EYES: Eyes grossly normal to  inspection.  No discharge or erythema, or obvious scleral/conjunctival abnormalities.  RESP: No audible wheeze, cough, or visible cyanosis.    SKIN: Visible skin clear. No significant rash, abnormal pigmentation or lesions.  NEURO: Cranial nerves grossly intact.  Mentation and speech appropriate for age.  PSYCH: Appropriate affect, tone, and pace of words    Office Visit on 09/18/2023   Component Date Value Ref Range Status    Cholesterol 09/18/2023 201 (H)  <200 mg/dL Final    Triglycerides 09/18/2023 54  <150 mg/dL Final    Direct Measure HDL 09/18/2023 89  >=50 mg/dL Final    LDL Cholesterol Calculated 09/18/2023 101 (H)  <=100 mg/dL Final    Non HDL Cholesterol 09/18/2023 112  <130 mg/dL Final    Glucose 09/18/2023 102 (H)  70 - 99 mg/dL Final    Patient Fasting > 8hrs? 09/18/2023 Yes   Final    TSH 09/18/2023 1.44  0.30 - 4.20 uIU/mL Final         Video-Visit Details    Type of service:  Video Visit   Originating Location (pt. Location): Home    Distant Location (provider location):  On-site  Platform used for Video Visit: Nicola  Signed Electronically by: RAMANDEEP Lorenzana CNP

## 2024-12-07 ENCOUNTER — HEALTH MAINTENANCE LETTER (OUTPATIENT)
Age: 44
End: 2024-12-07

## 2024-12-10 ENCOUNTER — TRANSFERRED RECORDS (OUTPATIENT)
Dept: HEALTH INFORMATION MANAGEMENT | Facility: CLINIC | Age: 44
End: 2024-12-10
Payer: COMMERCIAL

## 2025-02-28 ENCOUNTER — TELEPHONE (OUTPATIENT)
Dept: FAMILY MEDICINE | Facility: CLINIC | Age: 45
End: 2025-02-28
Payer: COMMERCIAL

## 2025-02-28 DIAGNOSIS — N94.3 PMS (PREMENSTRUAL SYNDROME): ICD-10-CM

## 2025-02-28 DIAGNOSIS — G43.829 MENSTRUAL MIGRAINE WITHOUT STATUS MIGRAINOSUS, NOT INTRACTABLE: ICD-10-CM

## 2025-02-28 NOTE — TELEPHONE ENCOUNTER
Clinic RN: Please investigate patient's chart or contact patient if the information cannot be found because  per LOV on 6/25/24, patient to discuss medication with OB/GYN.  Has patient followed up with OB and/or are they managing medication?           Erica Nicholson RN  [facility]

## 2025-03-03 RX ORDER — PROGESTERONE 200 MG/1
CAPSULE ORAL
Qty: 30 CAPSULE | Refills: 3 | OUTPATIENT
Start: 2025-03-03

## 2025-03-03 NOTE — TELEPHONE ENCOUNTER
Writer followed up with patient. They do not need a refill right now. They have enough medication for a few months. Patient did not follow up with OBGYN.    Patient needing physical scheduled, writer assisted in scheduling patient for appt on 03/18/2025 with PCP.    Georgia Quintero RN

## 2025-04-01 ENCOUNTER — OFFICE VISIT (OUTPATIENT)
Dept: FAMILY MEDICINE | Facility: CLINIC | Age: 45
End: 2025-04-01
Payer: COMMERCIAL

## 2025-04-01 ENCOUNTER — PATIENT OUTREACH (OUTPATIENT)
Dept: ONCOLOGY | Facility: CLINIC | Age: 45
End: 2025-04-01

## 2025-04-01 VITALS
HEART RATE: 77 BPM | HEIGHT: 63 IN | WEIGHT: 143 LBS | DIASTOLIC BLOOD PRESSURE: 73 MMHG | SYSTOLIC BLOOD PRESSURE: 112 MMHG | BODY MASS INDEX: 25.34 KG/M2 | RESPIRATION RATE: 12 BRPM | OXYGEN SATURATION: 99 % | TEMPERATURE: 97.8 F

## 2025-04-01 DIAGNOSIS — Z80.3 FAMILY HISTORY OF BREAST CANCER IN FEMALE: ICD-10-CM

## 2025-04-01 DIAGNOSIS — Z80.0 FAMILY HISTORY OF COLON CANCER: ICD-10-CM

## 2025-04-01 DIAGNOSIS — Z00.00 VISIT FOR PREVENTIVE HEALTH EXAMINATION: Primary | ICD-10-CM

## 2025-04-01 DIAGNOSIS — Z80.51 FAMILY HISTORY OF KIDNEY CANCER: ICD-10-CM

## 2025-04-01 LAB
CHOLEST SERPL-MCNC: 196 MG/DL
EST. AVERAGE GLUCOSE BLD GHB EST-MCNC: 111 MG/DL
FASTING STATUS PATIENT QL REPORTED: NO
HBA1C MFR BLD: 5.5 % (ref 0–5.6)
HDLC SERPL-MCNC: 79 MG/DL
LDLC SERPL CALC-MCNC: 105 MG/DL
NONHDLC SERPL-MCNC: 117 MG/DL
TRIGL SERPL-MCNC: 58 MG/DL

## 2025-04-01 PROCEDURE — 3074F SYST BP LT 130 MM HG: CPT | Performed by: NURSE PRACTITIONER

## 2025-04-01 PROCEDURE — 80061 LIPID PANEL: CPT | Performed by: NURSE PRACTITIONER

## 2025-04-01 PROCEDURE — 99213 OFFICE O/P EST LOW 20 MIN: CPT | Mod: 25 | Performed by: NURSE PRACTITIONER

## 2025-04-01 PROCEDURE — 36415 COLL VENOUS BLD VENIPUNCTURE: CPT | Performed by: NURSE PRACTITIONER

## 2025-04-01 PROCEDURE — 99396 PREV VISIT EST AGE 40-64: CPT | Performed by: NURSE PRACTITIONER

## 2025-04-01 PROCEDURE — 3078F DIAST BP <80 MM HG: CPT | Performed by: NURSE PRACTITIONER

## 2025-04-01 PROCEDURE — 83036 HEMOGLOBIN GLYCOSYLATED A1C: CPT | Performed by: NURSE PRACTITIONER

## 2025-04-01 SDOH — HEALTH STABILITY: PHYSICAL HEALTH: ON AVERAGE, HOW MANY DAYS PER WEEK DO YOU ENGAGE IN MODERATE TO STRENUOUS EXERCISE (LIKE A BRISK WALK)?: 4 DAYS

## 2025-04-01 ASSESSMENT — SOCIAL DETERMINANTS OF HEALTH (SDOH): HOW OFTEN DO YOU GET TOGETHER WITH FRIENDS OR RELATIVES?: ONCE A WEEK

## 2025-04-01 NOTE — PROGRESS NOTES
Writer received referral to Cancer Risk Management/Genetic Counseling.    Referred for:    mother had Breast cancer age 50, maternal grandmother had breast 60's, kidney, and colon 60's    Referred By    Provider Department Location Phone   Merary Gonzalez APRN CNP [facility]/Ob [facility] [phone]       Referral reviewed for appropriate plan, and sent to New Patient Scheduling ([phone]) for completion.    Renate Shi, RN, BSN  Oncology New Patient Nurse Navigator   [facility]

## 2025-04-01 NOTE — PROGRESS NOTES
"Preventive Care Visit  [facility]  RAMANDEEP Lorenzana CNP, Family Medicine  Apr 1, 2025      Assessment & Plan     Family history of breast cancer in female  **  - Adult Oncology/Hematology  Referral    Visit for preventive health examination  **  - Lipid Profile (Chol, Trig, HDL, LDL calc)  - Hemoglobin A1c  - Colonoscopy Screening  Referral  - Lipid Profile (Chol, Trig, HDL, LDL calc)  - Hemoglobin A1c    Family history of colon cancer  **  - Adult Oncology/Hematology  Referral  - Colonoscopy Screening  Referral    Family history of kidney cancer  **  - Adult Oncology/Hematology  Referral    -A1c stable.   -Prior labs reviewed with patient today.   Will get colonoscopy.  Genetics referral placed since extensive history of cancer on maternal side.  Will evaluate if they will follow-up with her.   -Will see OB/GYN for Pap.     Patient has been advised of split billing requirements and indicates understanding: Yes        BMI  Estimated body mass index is 25.33 kg/m  as calculated from the following:    Height as of this encounter: 1.6 m (5' 3\").    Weight as of this encounter: 64.9 kg (143 lb).       Counseling  Appropriate preventive services were addressed with this patient via screening, questionnaire, or discussion as appropriate for fall prevention, nutrition, physical activity, Tobacco-use cessation, social engagement, weight loss and cognition.  Checklist reviewing preventive services available has been given to the patient.  Reviewed patient's diet, addressing concerns and/or questions.   She is at risk for psychosocial distress and has been provided with information to reduce risk.       Radha Hand is a 44 year old, presenting for the following:  Physical    -Has been using progesterone only sparingly and as needed.  She is still having regular periods and has plans on seeing OB/GYN.  She has a family history of breast " cancer, younger age and her mother, and with her grandmother.  She plans on getting colonoscopy completed.  She denied any bowel or urinary changes.         4/1/2025     9:35 AM   Additional Questions   Roomed by Roge MORRISON           Advance Care Planning  Patient does not have a Health Care Directive: Discussed advance care planning with patient; however, patient declined at this time.      4/1/2025   General Health   How would you rate your overall physical health? Good   Feel stress (tense, anxious, or unable to sleep) To some extent   (!) STRESS CONCERN      4/1/2025   Nutrition   Three or more servings of calcium each day? Yes   Diet: Regular (no restrictions)   How many servings of fruit and vegetables per day? (!) 2-3   How many sweetened beverages each day? 0-1         4/1/2025   Exercise   Days per week of moderate/strenous exercise 4 days         4/1/2025   Social Factors   Frequency of gathering with friends or relatives Once a week   Worry food won't last until get money to buy more No   Food not last or not have enough money for food? No   Do you have housing? (Housing is defined as stable permanent housing and does not include staying ouside in a car, in a tent, in an abandoned building, in an overnight shelter, or couch-surfing.) Yes   Are you worried about losing your housing? No   Lack of transportation? No   Unable to get utilities (heat,electricity)? No         4/1/2025   Dental   Dentist two times every year? Yes           Today's PHQ-2 Score:       4/1/2025     9:27 AM   PHQ-2 ( 1999 [employer])   Q1: Little interest or pleasure in doing things 0   Q2: Feeling down, depressed or hopeless 0   PHQ-2 Score 0    Q1: Little interest or pleasure in doing things Not at all   Q2: Feeling down, depressed or hopeless Not at all   PHQ-2 Score 0       Patient-reported           4/1/2025   Substance Use   Alcohol more than 3/day or more than 7/wk No   Do you use any other substances recreationally? No      Social History     Tobacco Use    Smoking status: Never    Smokeless tobacco: Never   Vaping Use    Vaping status: Never Used   Substance Use Topics    Alcohol use: Never    Drug use: Never           9/17/2023   LAST FHS-7 RESULTS   1st degree relative breast or ovarian cancer Yes   Any relative bilateral breast cancer No   Any male have breast cancer No   Any ONE woman have BOTH breast AND ovarian cancer No   Any woman with breast cancer before 50yrs No   2 or more relatives with breast AND/OR ovarian cancer Yes   2 or more relatives with breast AND/OR bowel cancer No        Mammogram Screening - Mammogram every 1-2 years updated in Health Maintenance based on mutual decision making        4/1/2025   STI Screening   New sexual partner(s) since last STI/HIV test? No     History of abnormal Pap smear: No - age 30- 64 PAP with HPV every 5 years recommended       ASCVD Risk   The 10-year ASCVD risk score (Katy GIBBS, et al., 2019) is: 0.3%    Values used to calculate the score:      Age: 44 years      Sex: Female      Is Non- : No      Diabetic: No      Tobacco smoker: No      Systolic Blood Pressure: 112 mmHg      Is BP treated: No      HDL Cholesterol: 89 mg/dL      Total Cholesterol: 201 mg/dL        4/1/2025   Contraception/Family Planning   Questions about contraception or family planning No        Reviewed and updated as needed this visit by Provider                    Past Medical History:   Diagnosis Date    PONV (postoperative nausea and vomiting)      Past Surgical History:   Procedure Laterality Date    LAPAROSCOPIC TUBAL DYE STUDY Bilateral 7/16/2021    Procedure: LAPAROSCOPY WITH A TUBAL DYE STUDY, FULGURATION OF  ENDOMETRIOSIS;  Surgeon: Vasu Khan MD;  Location: [facility] [place] OR    NO PAST SURGERIES       Labs reviewed in EPIC      Review of Systems  Constitutional, HEENT, cardiovascular, pulmonary, gi and gu systems are negative, except as otherwise  "noted.     Objective    Exam  /73   Pulse 77   Temp 97.8  F (36.6  C) (Oral)   Resp 12   Ht 1.6 m (5' 3\")   Wt 64.9 kg (143 lb)   LMP 02/08/2025 (Exact Date)   SpO2 99%   BMI 25.33 kg/m     Estimated body mass index is 25.33 kg/m  as calculated from the following:    Height as of this encounter: 1.6 m (5' 3\").    Weight as of this encounter: 64.9 kg (143 lb).    Physical Exam  GENERAL: alert and no distress  EYES: Eyes grossly normal to inspection, PERRL and conjunctivae and sclerae normal  NECK: no adenopathy, no asymmetry, masses, or scars  RESP: lungs clear to auscultation - no rales, rhonchi or wheezes  CV: regular rate and rhythm, normal S1 S2, no S3 or S4, no murmur, click or rub, no peripheral edema  MS: no gross musculoskeletal defects noted, no edema  SKIN: no suspicious lesions or rashes  NEURO: Normal strength and tone, mentation intact and speech normal  PSYCH: mentation appears normal, affect normal/bright        Signed Electronically by: RAMANDEEP Lorenzana CNP    "

## (undated) DEVICE — CUSTOM PACK PELVISCOPY SMA5BPVHEA

## (undated) DEVICE — NDL BLUNT 18GA 1.5" FILTER 305211

## (undated) DEVICE — UTERINE MANIPULATOR HUMI KRONER 6003

## (undated) DEVICE — SOL NACL 0.9% 100ML BAG 2B1302

## (undated) DEVICE — SUTURE VICRYL+ 4-0 UNDYED PS-2 VCP496H

## (undated) DEVICE — Device

## (undated) DEVICE — DRESSING TEGADERM IV 2 3/4 X 3 1/4 1633

## (undated) DEVICE — SOL WATER IRRIG 1000ML BOTTLE 2F7114

## (undated) DEVICE — PLATE GROUNDING ADULT W/CORD 9165L

## (undated) DEVICE — PREP DURAPREP 26ML APL 8630

## (undated) DEVICE — DRSG TELFA 3X4" 1050

## (undated) DEVICE — TROCAR ADV FIXATION NONBLADED 5X100MM

## (undated) DEVICE — ESU HOLDER LAP INST DISP PURPLE LONG 330MM H-PRO-330

## (undated) DEVICE — PREP POVIDONE IODINE SOLUTION 10% 4OZ BOTTLE 29906-004

## (undated) DEVICE — SOL RINGERS LACTATED 1000ML BAG 2B2324X

## (undated) DEVICE — DRSG STERI STRIP 1/2X4" R1547

## (undated) DEVICE — GLOVE BIOGEL PI ULTRATOUCH G SZ 7.5 42175

## (undated) DEVICE — BARRIER INTERCEED 3X4" 4350

## (undated) DEVICE — DRSG GAUZE 2X2" 8042

## (undated) DEVICE — ENDO TROCAR SLEEVE KII ADV FIXATION 05X100MM CFS02

## (undated) DEVICE — PREP POVIDONE-IODINE 7.5% SCRUB 4OZ BOTTLE MDS093945

## (undated) DEVICE — DECANTER BAG 2002S

## (undated) DEVICE — MAT FLOOR SURGICAL 40X38 0702140238

## (undated) DEVICE — SUCTION MANIFOLD NEPTUNE 2 SYS 4 PORT 0702-020-000

## (undated) DEVICE — SYR 50ML LL W/O NDL 309653

## (undated) DEVICE — BRIEF STRETCH XL MPS40

## (undated) DEVICE — ADH SKIN CLOSURE PREMIERPRO EXOFIN 1.0ML 3470

## (undated) DEVICE — SU PLAIN 4-0 FS-2 27" H821H

## (undated) DEVICE — TUBING LAP SUCT/IRRIG STRYKER 250070500

## (undated) DEVICE — TUBING IV EXTENSION SET ANESTHESIA 34" MLL 2C6227

## (undated) DEVICE — CATH FOLEY 5CC 16FR SIL/LTX 0165V16S

## (undated) DEVICE — TUBING SMOKE EVAC PLUME PORT PP010CS

## (undated) DEVICE — ENDO SHEARS RENEW LAP ENDOCUT SCISSOR TIP 16.5MM 3142